# Patient Record
Sex: MALE | Race: WHITE | URBAN - METROPOLITAN AREA
[De-identification: names, ages, dates, MRNs, and addresses within clinical notes are randomized per-mention and may not be internally consistent; named-entity substitution may affect disease eponyms.]

---

## 2020-11-09 ENCOUNTER — HISTORICAL (OUTPATIENT)
Dept: ADMINISTRATIVE | Facility: HOSPITAL | Age: 80
End: 2020-11-09

## 2022-04-30 NOTE — OP NOTE
Patient:   Bala Knight             MRN: [id number]            FIN: [id number]               Age:   80 years     Sex:  Male     :  1940   Associated Diagnoses:   None   Author:   Jeronimo Greer MD      Preoperative diagnosis: Cataract of the left eye     Postoperative diagnosis: Same     Operative procedure: Cataract extraction with intraocular lens implant    Lens Style: [id number]    The patient was brought to the operating theater by wheelchair and under light sedation given topical anesthesia using 0.75% Marcaine.  After adequate anesthesia the patient was then prepped and draped in usual ophthalmological manner.   Faheem lid speculums were applied and under the surgical microscope a keratome incision was made temporally using a bobbi keratome blade and a 15° bobbi keratome stab incision was made in the superior nasal quadrant.  Viscoat was instilled into the anterior chamber and an anterior capsulorrhexis was performed using a bent 25-gauge needle and the anterior capsule flap withdrawn with Kelman forceps. Using an irrigating Kelman cystotome the nucleus was irrigated and rocked free from the cortical bed and the handpiece was withdrawn. The phacoemulsification handpiece was introduced into the eye and phacoemulsification carried out the posterior chamber and the iris plane while a nucleus manipulator was used to direct the nucleus fragments  towards the phacoemulsification tip and prevent corneal contact. After phacoemulsification of the nucleus was completed the handpiece was withdrawn and an irrigation-aspiration handpiece was introduced into the eye and all visible cortical fragments were aspirated from the eye without difficulty. The handpiece was withdrawn and Healon was introduced into the eye to inflate the anterior chamber and the capsular bag.  An intraocular lens implant was selected, inspected, folded and placed into the lens injector and then the lens carefully injected into the  capsular bag while the haptics were positioned using the nucleus manipulator. Miostat was used to constrict the pupil and because hs is at times confused a 10-0 safety suture of Ethilon was inserted, tied, cut short and the knot buried.  The Healon was then aspirated from the eye using an irrigation-aspiration handpiece and the handpiece withdrawn from the eye.   The anterior chamber irrigated with balanced salt solution.  The anterior chamber was inflated with balanced salt solution and the wound checked for water tightness and found to be intact.  The antibiotic drops used preoperatively were instilled into the eye and the patient sent to the outpatient recovery in good condition.

## 2024-12-16 RX ORDER — CHOLECALCIFEROL (VITAMIN D3) 25 MCG
1000 TABLET ORAL DAILY
COMMUNITY

## 2024-12-16 RX ORDER — MOXIFLOXACIN 5 MG/ML
1 SOLUTION/ DROPS OPHTHALMIC 3 TIMES DAILY
COMMUNITY

## 2024-12-16 RX ORDER — SIMVASTATIN 40 MG/1
40 TABLET, FILM COATED ORAL NIGHTLY
COMMUNITY

## 2024-12-16 RX ORDER — DONEPEZIL HYDROCHLORIDE 10 MG/1
10 TABLET, FILM COATED ORAL NIGHTLY
COMMUNITY

## 2024-12-16 RX ORDER — BROMFENAC 1.03 MG/ML
1 SOLUTION/ DROPS OPHTHALMIC 2 TIMES DAILY
COMMUNITY

## 2024-12-17 ENCOUNTER — ANESTHESIA EVENT (OUTPATIENT)
Facility: HOSPITAL | Age: 84
End: 2024-12-17
Payer: MEDICARE

## 2024-12-17 NOTE — ANESTHESIA PREPROCEDURE EVALUATION
"                                                                                                             12/17/2024  Bala Knight is a 84 y.o., male.    Pre-operative evaluation for Procedure(s) (LRB):  PHACOEMULSIFICATION, CATARACT, WITH IOL INSERTION    ////right eye (Right)    Bala Knight is a 84 y.o. male     There is no problem list on file for this patient.      Review of patient's allergies indicates:  No Known Allergies    No current facility-administered medications on file prior to encounter.     Current Outpatient Medications on File Prior to Encounter   Medication Sig Dispense Refill    bromfenac (XIBROM) 0.09 % ophthalmic solution 1 drop 2 (two) times daily.      donepeziL (ARICEPT) 10 MG tablet Take 10 mg by mouth every evening.      moxifloxacin (VIGAMOX) 0.5 % ophthalmic solution 1 drop 3 (three) times daily.      simvastatin (ZOCOR) 40 MG tablet Take 40 mg by mouth every evening.      vitamin D (VITAMIN D3) 1000 units Tab Take 1,000 Units by mouth once daily.         Past Surgical History:   Procedure Laterality Date    APPENDECTOMY      TONSILLECTOMY       CBC: No results for input(s): "WBC", "RBC", "HGB", "HCT", "PLT", "MCV", "MCH", "MCHC" in the last 72 hours.    CMP: No results for input(s): "NA", "K", "CL", "CO2", "BUN", "CREATININE", "GLU", "MG", "PHOS", "CALCIUM", "ALBUMIN", "PROT", "ALKPHOS", "ALT", "AST", "BILITOT" in the last 72 hours.    INR  No results for input(s): "PT", "INR", "PROTIME", "APTT" in the last 72 hours.        Diagnostic Studies:  CXR :    EKG :      2D Echo :  No results found for this or any previous visit.  Pre-op Assessment    I have reviewed the Patient Summary Reports.     I have reviewed the Nursing Notes. I have reviewed the NPO Status.   I have reviewed the Medications.     Review of Systems  Anesthesia Hx:  No problems with previous Anesthesia   History of prior surgery of interest to airway management or planning:  Previous anesthesia: General Appy:; " Tonsillectomy:; with general anesthesia.       Airway issues documented on chart review include GETA     Denies Family Hx of Anesthesia complications.    Denies Personal Hx of Anesthesia complications.                    Social:  Non-Smoker, No Alcohol Use       Hematology/Oncology:  Hematology Normal   Oncology Normal                                   EENT/Dental:  EENT/Dental Normal    Ears General/Symptom(s) Hearing Impairment: deaf - left, deaf- right, hearing-aid left, hearing-aid right            Cardiovascular:     Denies Pacemaker.            hyperlipidemia     Patient not on beta blockers                          Pulmonary:  Pulmonary Normal                       Renal/:  Chronic Renal Disease, CKD   -4             Hepatic/GI:  Hepatic/GI Normal        Not Taking GLP-1 Agonists            Musculoskeletal:  Musculoskeletal Normal                Neurological:  Neurology Normal          Poor memory                            Endocrine:  Endocrine Normal            Dermatological:  Skin Normal    Psych:  Psychiatric Normal                    Physical Exam  General: Cooperative, Alert and Oriented    Airway:  Mallampati: III / III  Mouth Opening: Normal  TM Distance: Normal  Tongue: Normal  Neck ROM: Normal ROM    Dental:  Intact  Px denies any loose teeth.  Chest/Lungs:  Clear to auscultation, Normal Respiratory Rate    Heart:  Rate: Normal  Rhythm: Regular Rhythm    Abdomen:  Normal, Soft        Anesthesia Plan  Type of Anesthesia, risks & benefits discussed:    Anesthesia Type: MAC  Intra-op Monitoring Plan: Standard ASA Monitors  Induction:  IV  Informed Consent: Informed consent signed with the Patient and all parties understand the risks and agree with anesthesia plan.  All questions answered.   ASA Score: 3  Day of Surgery Review of History & Physical: H&P Update referred to the surgeon/provider.I have interviewed and examined the patient. I have reviewed the patient's H&P dated:   Anesthesia Plan  Notes: TIVA with mask/NC, GA back-up.   Nasal cannula vs facemask supplemental oxygenation   For patients with PANCHO/obesity, may consider SuperNoval Nasal CPAP      Ready For Surgery From Anesthesia Perspective.     .

## 2024-12-18 ENCOUNTER — ANESTHESIA (OUTPATIENT)
Facility: HOSPITAL | Age: 84
End: 2024-12-18
Payer: MEDICARE

## 2024-12-18 ENCOUNTER — HOSPITAL ENCOUNTER (OUTPATIENT)
Facility: HOSPITAL | Age: 84
Discharge: HOME | End: 2024-12-18
Attending: SPECIALIST | Admitting: SPECIALIST
Payer: MEDICARE

## 2024-12-18 VITALS
OXYGEN SATURATION: 94 % | HEIGHT: 72 IN | BODY MASS INDEX: 25.06 KG/M2 | WEIGHT: 185 LBS | TEMPERATURE: 99 F | DIASTOLIC BLOOD PRESSURE: 74 MMHG | SYSTOLIC BLOOD PRESSURE: 140 MMHG | HEART RATE: 40 BPM

## 2024-12-18 DIAGNOSIS — H26.9 CATARACT: ICD-10-CM

## 2024-12-18 PROCEDURE — 36000706: Performed by: SPECIALIST

## 2024-12-18 PROCEDURE — 36000707: Performed by: SPECIALIST

## 2024-12-18 PROCEDURE — 37000009 HC ANESTHESIA EA ADD 15 MINS: Performed by: SPECIALIST

## 2024-12-18 PROCEDURE — 37000008 HC ANESTHESIA 1ST 15 MINUTES: Performed by: SPECIALIST

## 2024-12-18 PROCEDURE — 63600175 PHARM REV CODE 636 W HCPCS: Performed by: SPECIALIST

## 2024-12-18 PROCEDURE — 27201423 OPTIME MED/SURG SUP & DEVICES STERILE SUPPLY: Performed by: SPECIALIST

## 2024-12-18 PROCEDURE — V2632 POST CHMBR INTRAOCULAR LENS: HCPCS | Performed by: SPECIALIST

## 2024-12-18 PROCEDURE — 71000015 HC POSTOP RECOV 1ST HR: Performed by: SPECIALIST

## 2024-12-18 PROCEDURE — 63600175 PHARM REV CODE 636 W HCPCS: Performed by: NURSE ANESTHETIST, CERTIFIED REGISTERED

## 2024-12-18 PROCEDURE — 25000003 PHARM REV CODE 250: Performed by: SPECIALIST

## 2024-12-18 PROCEDURE — 25000003 PHARM REV CODE 250

## 2024-12-18 DEVICE — TECNIS SIMPLICITY TECNIS EYHANCE U 16.0D
Type: IMPLANTABLE DEVICE | Site: EYE | Status: FUNCTIONAL
Brand: TECNIS SIMPLICITY

## 2024-12-18 RX ORDER — LIDOCAINE HYDROCHLORIDE 10 MG/ML
INJECTION, SOLUTION EPIDURAL; INFILTRATION; INTRACAUDAL; PERINEURAL
Status: DISCONTINUED | OUTPATIENT
Start: 2024-12-18 | End: 2024-12-18

## 2024-12-18 RX ORDER — LIDOCAINE HYDROCHLORIDE 20 MG/ML
INJECTION, SOLUTION EPIDURAL; INFILTRATION; INTRACAUDAL; PERINEURAL
Status: DISCONTINUED | OUTPATIENT
Start: 2024-12-18 | End: 2024-12-18 | Stop reason: HOSPADM

## 2024-12-18 RX ORDER — TETRACAINE HYDROCHLORIDE 5 MG/ML
SOLUTION OPHTHALMIC
Status: DISCONTINUED | OUTPATIENT
Start: 2024-12-18 | End: 2024-12-18 | Stop reason: HOSPADM

## 2024-12-18 RX ORDER — PHENYLEPHRINE HYDROCHLORIDE 100 MG/ML
2 SOLUTION/ DROPS OPHTHALMIC
Status: COMPLETED | OUTPATIENT
Start: 2024-12-18 | End: 2024-12-18

## 2024-12-18 RX ORDER — PROPOFOL 10 MG/ML
VIAL (ML) INTRAVENOUS
Status: DISCONTINUED | OUTPATIENT
Start: 2024-12-18 | End: 2024-12-18

## 2024-12-18 RX ORDER — EPINEPHRINE 1 MG/ML
INJECTION, SOLUTION, CONCENTRATE INTRAVENOUS
Status: DISCONTINUED | OUTPATIENT
Start: 2024-12-18 | End: 2024-12-18 | Stop reason: HOSPADM

## 2024-12-18 RX ORDER — BUPIVACAINE HYDROCHLORIDE 7.5 MG/ML
0.1 INJECTION, SOLUTION EPIDURAL; RETROBULBAR
Status: COMPLETED | OUTPATIENT
Start: 2024-12-18 | End: 2024-12-18

## 2024-12-18 RX ORDER — MOXIFLOXACIN 5 MG/ML
1 SOLUTION/ DROPS OPHTHALMIC
Status: COMPLETED | OUTPATIENT
Start: 2024-12-18 | End: 2024-12-18

## 2024-12-18 RX ORDER — BROMFENAC SODIUM 0.7 MG/ML
1 SOLUTION/ DROPS OPHTHALMIC
Status: COMPLETED | OUTPATIENT
Start: 2024-12-18 | End: 2024-12-18

## 2024-12-18 RX ORDER — TROPICAMIDE 10 MG/ML
2 SOLUTION/ DROPS OPHTHALMIC
Status: COMPLETED | OUTPATIENT
Start: 2024-12-18 | End: 2024-12-18

## 2024-12-18 RX ADMIN — MOXIFLOXACIN 1 DROP: 5 SOLUTION/ DROPS OPHTHALMIC at 08:12

## 2024-12-18 RX ADMIN — TROPICAMIDE 2 DROP: 10 SOLUTION/ DROPS OPHTHALMIC at 08:12

## 2024-12-18 RX ADMIN — BUPIVACAINE HYDROCHLORIDE 0.75 MG: 7.5 INJECTION, SOLUTION EPIDURAL; RETROBULBAR at 08:12

## 2024-12-18 RX ADMIN — PROPOFOL 25 MG: 10 INJECTION, EMULSION INTRAVENOUS at 10:12

## 2024-12-18 RX ADMIN — PHENYLEPHRINE HYDROCHLORIDE 2 DROP: 100 SOLUTION/ DROPS OPHTHALMIC at 08:12

## 2024-12-18 RX ADMIN — BROMFENAC SODIUM 1 DROP: 0.7 SOLUTION/ DROPS OPHTHALMIC at 08:12

## 2024-12-18 RX ADMIN — LIDOCAINE HYDROCHLORIDE 30 MG: 10 INJECTION, SOLUTION EPIDURAL; INFILTRATION; INTRACAUDAL; PERINEURAL at 10:12

## 2024-12-18 RX ADMIN — PROPOFOL 10 MG: 10 INJECTION, EMULSION INTRAVENOUS at 10:12

## 2024-12-18 NOTE — TRANSFER OF CARE
Anesthesia Transfer of Care Note    Patient: aBla Knight    Procedure(s) Performed: Procedure(s) (LRB):  PHACOEMULSIFICATION, CATARACT, WITH IOL INSERTION    ////right eye (Right)    Patient location: PACU    Anesthesia Type: MAC    Transport from OR: Transported from OR on room air with adequate spontaneous ventilation    Post pain: adequate analgesia    Post assessment: no apparent anesthetic complications    Post vital signs: stable    Level of consciousness: awake, alert and oriented    Nausea/Vomiting: no nausea/vomiting    Complications: none    Transfer of care protocol was followed      Last vitals: Visit Vitals  /72   Pulse (!) 54   Temp 37.4 °C (99.3 °F) (Oral)   Ht 6' (1.829 m)   Wt 83.9 kg (185 lb)   SpO2 95%   BMI 25.09 kg/m²

## 2024-12-18 NOTE — OP NOTE
Pre op diagnosis: Nuclear sclerotic cataract od eye    Post op diagnosis: same    Anesthesia: local    Procedure: Phacoemulsification of Cataract with Posterior Chamber Implant od eye.    The patient was given a diagnosis of significant cataract od eye.The risks and benefits of cataract surgery were explained , the patient desired to have the surgery done.The patient was given topical lidocaine drops for anesthetic purposes and prepped and draped in sterile fashion.    The microscope was centered and focused in a temporal position and a supersharp blade was used to make a paracentesis in the corneal limbus. Dispersive viscoelastic was placed in the anterior chamber.A 2.4 mm keratome blade was used to enter the anterior chamber using a self sealing technique. The cystatome blade was used to initiate a capsulorhexis that was completed 360 degrees using Utrata forceps.BSS thru an A/C chamber cannula was used to perform hydro dissection and hydro delineation.Phacoemulsification was then begun by carving a deep groove down the middle of the nucleus and then  it into 2 halves with the phaco tip and the Porter chopper thru the secondary incision.Phaco was completed using a chop technique.The cortex was then removed using the I and A tip.All of the lens material was removed without any tears to the anterior or posterior capsule.Cohesive viscoelastic was then used to inflate the capsular bag. A foldable lens was then injected and securely placed in the capsular bag.The I and A unit was then used to remove the remaining viscoelastic.A 10-0 bio sorb suture was not placed. BSS thru an A?C cannula was used to seal the wound by stromal hydration as well as to form the anterior chamber and bring the eye to physiologic IOP.The wound was checked for leaks and none were found.Copious Vigamox and a drop of Prolensa were placed topically prior to removing the lid speculum and drapes.The patient was sent to recovery instructed  by the nurses to use Vigamox TID and Prolensa BID.The patient was explained and given a post op instruction sheet to follow and instructed to return for follow up tomorrow to see Dr Melton in clinic.    Date of Procedure: 12/18/2024    Surgeon: MD Kahlil Buchanan MD  12/18/2024  10:41 AM

## 2024-12-18 NOTE — ANESTHESIA POSTPROCEDURE EVALUATION
Anesthesia Post Evaluation    Patient: Bala Knight    Procedure(s) Performed: Procedure(s) (LRB):  PHACOEMULSIFICATION, CATARACT, WITH IOL INSERTION    ////right eye (Right)    Final Anesthesia Type: general      Patient location during evaluation: OPS  Patient participation: Yes- Able to Participate  Level of consciousness: awake and alert, awake and oriented  Post-procedure vital signs: reviewed and stable  Pain management: adequate  Airway patency: patent    PONV status at discharge: No PONV  Anesthetic complications: no      Cardiovascular status: blood pressure returned to baseline, hemodynamically stable and stable  Respiratory status: unassisted, spontaneous ventilation and room air  Hydration status: euvolemic  Follow-up not needed.              Vitals Value Taken Time   /74 12/18/24 1104   Temp ** 12/18/24 1110   Pulse 40 12/18/24 1104   Resp ** 12/18/24 1110   SpO2 94 % 12/18/24 1041         No case tracking events are documented in the log.      Pain/Alexis Score: Alexis Score: 10 (12/18/2024 10:40 AM)

## 2024-12-18 NOTE — ANESTHESIA POSTPROCEDURE EVALUATION
Anesthesia Post Evaluation    Patient: Bala Knight    Procedure(s) Performed: Procedure(s) (LRB):  PHACOEMULSIFICATION, CATARACT, WITH IOL INSERTION    ////right eye (Right)    Final Anesthesia Type: MAC      Patient location during evaluation: OPS  Patient participation: Yes- Able to Participate  Level of consciousness: awake and alert  Post-procedure vital signs: reviewed and stable  Pain management: adequate  Airway patency: patent  PANCHO mitigation strategies: Preoperative initiation of continuous positive airway pressure (CPAP) or non-invasive positive pressure ventilation (NIPPV)  PONV status at discharge: No PONV  Anesthetic complications: no      Cardiovascular status: blood pressure returned to baseline  Respiratory status: unassisted and spontaneous ventilation  Hydration status: euvolemic  Follow-up not needed.              Vitals Value Taken Time   /74 12/18/24 1104   Temp 36.5 12/18/24 1113   Pulse 40 12/18/24 1104   Resp 18 12/18/24 1113   SpO2 94 % 12/18/24 1041         No case tracking events are documented in the log.      Pain/Alexis Score: Alexis Score: 10 (12/18/2024 10:40 AM)

## (undated) DEVICE — Device

## (undated) DEVICE — SYR W NDL BLN FILL 5ML 18GX1.5

## (undated) DEVICE — TIP I & A

## (undated) DEVICE — TIP PHACO 30 DEG BEVEL 20GA

## (undated) DEVICE — SYR 3ML LL 18GA 1.5IN

## (undated) DEVICE — GLOVE SIGNATURE MICRO LTX 7.5

## (undated) DEVICE — PACK CASSETTE TUBE ELLIPS FX

## (undated) DEVICE — PACK EYE LEBLANC DISPOSABLE

## (undated) DEVICE — ADAPTER DUAL NSL LUER M-M 7FT